# Patient Record
Sex: FEMALE | Race: WHITE | NOT HISPANIC OR LATINO
[De-identification: names, ages, dates, MRNs, and addresses within clinical notes are randomized per-mention and may not be internally consistent; named-entity substitution may affect disease eponyms.]

---

## 2018-06-12 ENCOUNTER — ASOB RESULT (OUTPATIENT)
Age: 43
End: 2018-06-12

## 2018-06-12 ENCOUNTER — APPOINTMENT (OUTPATIENT)
Dept: ANTEPARTUM | Facility: CLINIC | Age: 43
End: 2018-06-12
Payer: COMMERCIAL

## 2018-06-12 PROCEDURE — 76830 TRANSVAGINAL US NON-OB: CPT

## 2018-06-12 PROCEDURE — 76857 US EXAM PELVIC LIMITED: CPT

## 2021-06-22 ENCOUNTER — NON-APPOINTMENT (OUTPATIENT)
Age: 46
End: 2021-06-22

## 2021-06-22 ENCOUNTER — LABORATORY RESULT (OUTPATIENT)
Age: 46
End: 2021-06-22

## 2021-06-22 ENCOUNTER — APPOINTMENT (OUTPATIENT)
Dept: INFECTIOUS DISEASE | Facility: CLINIC | Age: 46
End: 2021-06-22
Payer: COMMERCIAL

## 2021-06-22 VITALS
WEIGHT: 145 LBS | HEIGHT: 61 IN | BODY MASS INDEX: 27.38 KG/M2 | DIASTOLIC BLOOD PRESSURE: 80 MMHG | RESPIRATION RATE: 16 BRPM | TEMPERATURE: 98.8 F | SYSTOLIC BLOOD PRESSURE: 124 MMHG | HEART RATE: 67 BPM | OXYGEN SATURATION: 97 %

## 2021-06-22 DIAGNOSIS — G89.29 HEADACHE, UNSPECIFIED: ICD-10-CM

## 2021-06-22 DIAGNOSIS — R53.83 OTHER FATIGUE: ICD-10-CM

## 2021-06-22 DIAGNOSIS — R51.9 HEADACHE, UNSPECIFIED: ICD-10-CM
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
## 2021-06-22 PROCEDURE — 99205 OFFICE O/P NEW HI 60 MIN: CPT | Mod: 25

## 2021-06-22 PROCEDURE — 36415 COLL VENOUS BLD VENIPUNCTURE: CPT

## 2021-06-22 PROCEDURE — 99072 ADDL SUPL MATRL&STAF TM PHE: CPT

## 2021-06-22 NOTE — REVIEW OF SYSTEMS
[Fever] : no fever [Chills] : no chills [Body Aches] : no body aches [Difficulty Sleeping] : difficulty sleeping [Feeling Tired] : feeling tired [Red Eyes] : eyes not red [Loss Of Hearing] : no hearing loss [Sore Throat] : no sore throat [Chest Pain] : no chest pain [Palpitations] : no palpitations [Leg Claudication] : no intermittent leg claudication [Lower Ext Edema] : no lower extremity edema [Shortness Of Breath] : no shortness of breath [Wheezing] : no wheezing [Cough] : no cough [SOB on Exertion] : no shortness of breath during exertion [Sputum] : not coughing up ~M sputum [Abdominal Pain] : no abdominal pain [Vomiting] : no vomiting [Constipation] : no constipation [Diarrhea] : no diarrhea [Dysuria] : no dysuria [Confused] : no confusion [Convulsions] : no convulsions [Suicidal] : not suicidal [Anxiety] : anxiety

## 2021-06-22 NOTE — ASSESSMENT
[FreeTextEntry1] : Patient with acute on chronic fatigue   syndrome. \par etiology of the chronic fatigue syndrome is probably her chronic pain.\par \par the worsening of fatigue is more related to her altered sleep cycle, again due to pain\par she describes her sleep as not restful.  She has increased pain whenever she tries to sleep on her R side.\par Less likely polymyalgia rheumatica\par \par the tularemia serology makes no clinical sense.  She has no risk history.  She does not have any of the symptoms that are associated with acute tularemia (one of the serologies was IGM positive), including fever or lymphadenopathy.  She does not meet any of the case definitions for the various types of acute tularemia.\par probably a false positive test\par \par Plan:\par \par 1. discussed the need for better pain management, and she will seek a referal\par 2. repeat tularemia serologies\par 3. ESR  \par 4. f/u with PCP\par \par discussed extensively with the patient and her  [Anticipatory Guidance] : anticipatory guidance

## 2021-06-22 NOTE — REASON FOR VISIT
[Consultation] : a consultation visit [Spouse] : spouse [FreeTextEntry1] : new pt here for a consult to discuss recent bw results/MRI\par pt not currently on antibiotics\par currently c/o fatigue and headache
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
## 2021-06-22 NOTE — PHYSICAL EXAM
[General Appearance - Alert] : alert [General Appearance - In No Acute Distress] : in no acute distress [Sclera] : the sclera and conjunctiva were normal [Bowel Sounds] : normal bowel sounds [Abdomen Soft] : soft [Abdomen Tenderness] : non-tender [] : no hepato-splenomegaly [Abdomen Mass (___ Cm)] : no abdominal mass palpated [Costovertebral Angle Tenderness] : no CVA tenderness [Cranial Nerves] : cranial nerves 2-12 were intact [Sensation] : the sensory exam was normal to light touch and pinprick [Oriented To Time, Place, And Person] : oriented to person, place, and time [Affect] : the affect was normal

## 2021-06-23 LAB
B BURGDOR IGG+IGM SER QL IB: NORMAL
T4 SERPL-MCNC: 7.7 UG/DL
TSH SERPL-ACNC: 2.5 UIU/ML

## 2021-06-24 LAB — ERYTHROCYTE [SEDIMENTATION RATE] IN BLOOD BY WESTERGREN METHOD: < 2 MM/HR

## 2021-06-25 ENCOUNTER — NON-APPOINTMENT (OUTPATIENT)
Age: 46
End: 2021-06-25

## 2021-06-26 LAB
F. TULARENSIS AB, IGG: NEGATIVE
F. TULARENSIS AB, IGM: POSITIVE
F. TULARENSIS INTERPRETATION: NORMAL

## 2021-06-28 ENCOUNTER — NON-APPOINTMENT (OUTPATIENT)
Age: 46
End: 2021-06-28

## 2024-11-27 ENCOUNTER — APPOINTMENT (OUTPATIENT)
Dept: OBGYN | Facility: CLINIC | Age: 49
End: 2024-11-27

## 2025-03-18 ENCOUNTER — APPOINTMENT (OUTPATIENT)
Dept: OBGYN | Facility: CLINIC | Age: 50
End: 2025-03-18

## 2025-03-18 ENCOUNTER — NON-APPOINTMENT (OUTPATIENT)
Age: 50
End: 2025-03-18

## 2025-04-01 ENCOUNTER — APPOINTMENT (OUTPATIENT)
Dept: OBGYN | Facility: CLINIC | Age: 50
End: 2025-04-01

## 2025-04-15 ENCOUNTER — APPOINTMENT (OUTPATIENT)
Dept: OBGYN | Facility: CLINIC | Age: 50
End: 2025-04-15

## 2025-04-24 ENCOUNTER — APPOINTMENT (OUTPATIENT)
Age: 50
End: 2025-04-24

## 2025-05-05 ENCOUNTER — APPOINTMENT (OUTPATIENT)
Age: 50
End: 2025-05-05

## 2025-07-09 ENCOUNTER — APPOINTMENT (OUTPATIENT)
Age: 50
End: 2025-07-09

## 2025-08-06 ENCOUNTER — APPOINTMENT (OUTPATIENT)
Age: 50
End: 2025-08-06